# Patient Record
Sex: FEMALE | Race: WHITE
[De-identification: names, ages, dates, MRNs, and addresses within clinical notes are randomized per-mention and may not be internally consistent; named-entity substitution may affect disease eponyms.]

---

## 2017-09-14 ENCOUNTER — HOSPITAL ENCOUNTER (OUTPATIENT)
Dept: HOSPITAL 76 - DI.N | Age: 52
Discharge: HOME | End: 2017-09-14
Attending: PHYSICIAN ASSISTANT
Payer: COMMERCIAL

## 2017-09-14 DIAGNOSIS — Z12.31: Primary | ICD-10-CM

## 2017-09-14 PROCEDURE — 77067 SCR MAMMO BI INCL CAD: CPT

## 2020-01-12 NOTE — MAMMOGRAPHY REPORT
DIGITAL SCREENING MAMMOGRAPHY:  09/14/2017 

 

COMPARISON:  03/26/2015, 02/07/2014, 07/31/2010, 05/30/2008. 

 

Bilateral digital CC, exaggerated CC, and MLO projections are obtained of the breasts. 

 

The breast tissue is heterogeneously dense.  There is no dominant mass, architectural distortion, ski
n thickening, suspicious microcalcifications, or significant interval change compared to the prior st
udy. 

 

IMPRESSION:  NEGATIVE. 

 

BIRADS CATEGORY:  1, NEGATIVE. 

 

SUGGEST RETURN TO ROUTINE SCREENING IN ONE YEAR. 

 

STANDARD QUALIFYING STATEMENTS

1.  This examination was reviewed with the aid of Computed-Aided Detection (CAD).

2.  A negative or benign imaging report should not delay biopsy if clinically suspicious findings are
 present.  Consider surgical consultation if warranted.  More than 5% of cancers are not identified b
y imaging.

3.  Dense breasts may obscure an underlying neoplasm. 

 

 

 

DD:09/18/2017 19:45:53  DT: 09/18/2017 20:25  JOB #: [id number]  EXT JOB #:[id number]
(3) adequate

## 2020-11-13 ENCOUNTER — HOSPITAL ENCOUNTER (OUTPATIENT)
Dept: HOSPITAL 76 - COV | Age: 55
Discharge: HOME | End: 2020-11-13
Attending: FAMILY MEDICINE
Payer: COMMERCIAL

## 2020-11-13 DIAGNOSIS — Z20.828: Primary | ICD-10-CM

## 2023-03-14 ENCOUNTER — HOSPITAL ENCOUNTER (OUTPATIENT)
Dept: HOSPITAL 76 - SDS | Age: 58
Discharge: HOME | End: 2023-03-14
Attending: SURGERY
Payer: COMMERCIAL

## 2023-03-14 VITALS — DIASTOLIC BLOOD PRESSURE: 86 MMHG | SYSTOLIC BLOOD PRESSURE: 116 MMHG

## 2023-03-14 DIAGNOSIS — D12.5: ICD-10-CM

## 2023-03-14 DIAGNOSIS — Z80.0: ICD-10-CM

## 2023-03-14 DIAGNOSIS — D12.3: ICD-10-CM

## 2023-03-14 DIAGNOSIS — Z12.11: Primary | ICD-10-CM

## 2023-03-14 DIAGNOSIS — D12.4: ICD-10-CM

## 2023-03-14 PROCEDURE — 0DBL8ZZ EXCISION OF TRANSVERSE COLON, VIA NATURAL OR ARTIFICIAL OPENING ENDOSCOPIC: ICD-10-PCS | Performed by: SURGERY

## 2023-03-14 PROCEDURE — 0DBM8ZZ EXCISION OF DESCENDING COLON, VIA NATURAL OR ARTIFICIAL OPENING ENDOSCOPIC: ICD-10-PCS | Performed by: SURGERY

## 2023-03-14 PROCEDURE — 0DBN8ZZ EXCISION OF SIGMOID COLON, VIA NATURAL OR ARTIFICIAL OPENING ENDOSCOPIC: ICD-10-PCS | Performed by: SURGERY

## 2023-03-14 PROCEDURE — 45385 COLONOSCOPY W/LESION REMOVAL: CPT

## 2023-03-14 PROCEDURE — 45380 COLONOSCOPY AND BIOPSY: CPT

## 2023-03-14 NOTE — ANESTHESIA POST OP EVALUATION
Anesthesia Post Eval





- Post Anesthesia Eval


Vitals: 





                                Last Vital Signs











Temp  36.4 C L  03/14/23 13:09


 


Pulse  97   03/14/23 13:09


 


Resp  16   03/14/23 13:09


 


BP  117/84 H  03/14/23 13:09


 


Pulse Ox  98   03/14/23 13:09


 


O2 Flow Rate      











CV Function Including HR & BP: Stable


Pain Control: Satisfactory


Nausea & Vomiting: Negative


Mental Status: Baseline


Respiratory Status: Airway Patent


Hydration Status: Satisfactory


Anesthesia Complications: None